# Patient Record
Sex: MALE | Race: WHITE | HISPANIC OR LATINO | Employment: OTHER | URBAN - METROPOLITAN AREA
[De-identification: names, ages, dates, MRNs, and addresses within clinical notes are randomized per-mention and may not be internally consistent; named-entity substitution may affect disease eponyms.]

---

## 2024-04-11 ENCOUNTER — CLINICAL SUPPORT (OUTPATIENT)
Dept: AUDIOLOGY | Facility: CLINIC | Age: 59
End: 2024-04-11
Payer: MEDICARE

## 2024-04-11 DIAGNOSIS — H90.3 ASYMMETRIC SNHL (SENSORINEURAL HEARING LOSS): Primary | ICD-10-CM

## 2024-04-11 PROCEDURE — 92550 TYMPANOMETRY & REFLEX THRESH: CPT | Mod: 59

## 2024-04-11 PROCEDURE — 92557 COMPREHENSIVE HEARING TEST: CPT | Mod: 59

## 2024-04-11 PROCEDURE — 92626 EVAL AUD FUNCJ 1ST HOUR: CPT

## 2024-04-11 NOTE — PROGRESS NOTES
COCHLEAR IMPLANT EVALUATION: ADULT (NON - CANDIDATE)    Name:  Navjot Lara  :  1965  Age:  58 y.o.  Date of Evaluation:  2024    HISTORY  Navjot Lara is seen today at the request of Dr. Philip Gillette for an evaluation of hearing and cochlear implant evaluation.  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 Navjot is unsure if CT scans were completed looking for skull fractures near the Cochlear but he does report brain swelling that was relieved via drilling during his 10 day hospital stay. He denies dizziness, ear pain, ear drainage, ear infections, ear surgeries and hearing aid usage.     AUDIOLOGIC EVALUATION COMPLETED ON 2024:    OTOSCOPY  Otoscopic inspection revealed mild wax and visualization of the eardrum bilaterally.    IMMITTANCE  Normal tympanograms were obtained bilaterally, consistent with a normal moving eardrums and the likely absence of fluid.    Ipsilateral acoustic reflexes were tested and absent at 500Hz, 1000Hz, 2000Hz, and 4000Hz bilaterally.    AUDIOMETRIC TESTING  Pure tone audiometry conducted via insert headphones from 125 Hz - 8000 Hz with good reliability was consistent with:  Right ear: normal hearing from 125 Hz - 1000 Hz sloping to a moderate sensorineural hearing loss  Left ear: Profound sensorineural hearing loss; no response at all test frequencies    SPEECH RECOGNITION TESTING (SRT)  SRT was in agreement with pure tone averages bilaterally ( Right: 15 dB HL, Left:  No response).    WORD RECOGNITION SCORE (WRS)  WRS was (96%) in the right ear and (0%) in the left ear using recorded ordered by difficulty NU6 word list.    COCHLEAR IMPLANT EVALUATION FROM 2024:    AIDED TESTING  Otoscopy indicated clear ear canals and visible tympanic membranes, bilaterally.    All testing was completed in the soundfield at 0 degrees  azimuth. Pure tone testing was obtained using frequency modulating (FM) stimuli, and SRT was obtained using monitored live voice (MLV). Word and sentence recognition testing was performed with recorded material at 50 dB HL (50 dB HL = 60 dB A).    R = Right Phonak Radha P90 UP hearing aid  SRT = 20 dB HL  CNC words in quiet at 50 dB HL  88%  AzBio in quiet at 50 dB HL  = 97%  AzBio in noise at 50 dB HL with 40 dB noise  = 89%  Pure tone responses from 250 Hz - 6000 Hz were in the mild hearing loss range.     L = Left Phonak Radha P90 UP hearing aid  SRT = NR at 80 dB HL  CNC words in quiet at 50 dB HL = 0%  AzBio in quiet at 50 dB HL  = 0%  Pure tone responses from 250 Hz - 6000 Hz were attempted but no response at tolerable soundfield limits.      RESULTS  Today's results were discussed with the patient.  Based on these results, this patient is not a cochlear implant candidate at this time due to Medicare.  Due to his bilateral bothersome tinnitus, reports of sound transferring to his left ear inside his head, and history of TBI I still encouraged him to follow up with Dr. Gillette. I also provided him with brochures on traditional hearing aids for the right or a CROS system. We discussed how this will and will not help with his tinnitus. I recommended he stopped smoking to determine if this will improve his tinnitus.    Time spent with patient:  90 minutes      Completed by:  Emelyn Zarate, CCC-A, [facility]  Licensed Audiologist

## 2024-04-23 ENCOUNTER — APPOINTMENT (OUTPATIENT)
Dept: OTOLARYNGOLOGY | Facility: CLINIC | Age: 59
End: 2024-04-23
Payer: MEDICARE

## 2024-04-25 ENCOUNTER — APPOINTMENT (OUTPATIENT)
Dept: AUDIOLOGY | Facility: CLINIC | Age: 59
End: 2024-04-25
Payer: MEDICARE